# Patient Record
Sex: FEMALE | Race: WHITE
[De-identification: names, ages, dates, MRNs, and addresses within clinical notes are randomized per-mention and may not be internally consistent; named-entity substitution may affect disease eponyms.]

---

## 2017-03-30 ENCOUNTER — HOSPITAL ENCOUNTER (EMERGENCY)
Dept: HOSPITAL 25 - ED | Age: 37
Discharge: HOME | End: 2017-03-30
Payer: COMMERCIAL

## 2017-03-30 VITALS — DIASTOLIC BLOOD PRESSURE: 78 MMHG | SYSTOLIC BLOOD PRESSURE: 133 MMHG
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
## 2017-03-30 DIAGNOSIS — S82.832A: Primary | ICD-10-CM

## 2017-03-30 DIAGNOSIS — M25.572: ICD-10-CM
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
## 2017-03-30 LAB
ALBUMIN SERPL BCG-MCNC: 4.2 G/DL (ref 3.2–5.2)
ALP SERPL-CCNC: 65 U/L (ref 34–104)
ALT SERPL W P-5'-P-CCNC: 11 U/L (ref 7–52)
ANION GAP SERPL CALC-SCNC: 7 MMOL/L (ref 2–11)
AST SERPL-CCNC: 13 U/L (ref 13–39)
BUN SERPL-MCNC: 16 MG/DL (ref 6–24)
BUN/CREAT SERPL: 20.3 (ref 8–20)
CALCIUM SERPL-MCNC: 8.1 MG/DL (ref 8.6–10.3)
CHLORIDE SERPL-SCNC: 105 MMOL/L (ref 101–111)
GLOBULIN SER CALC-MCNC: 2.2 G/DL (ref 2–4)
GLUCOSE SERPL-MCNC: 112 MG/DL (ref 70–100)
HCO3 SERPL-SCNC: 25 MMOL/L (ref 22–32)
HCT VFR BLD AUTO: 42 % (ref 35–47)
HGB BLD-MCNC: 13.4 G/DL (ref 12–16)
MAGNESIUM SERPL-MCNC: 1.6 MG/DL (ref 1.9–2.7)
MCH RBC QN AUTO: 25 PG (ref 27–31)
MCHC RBC AUTO-ENTMCNC: 32 G/DL (ref 31–36)
MCV RBC AUTO: 77 FL (ref 80–97)
POTASSIUM SERPL-SCNC: 4 MMOL/L (ref 3.5–5)
PROT SERPL-MCNC: 6.4 G/DL (ref 6.4–8.9)
RBC # BLD AUTO: 5.44 10^6/UL (ref 4–5.4)
SODIUM SERPL-SCNC: 137 MMOL/L (ref 133–145)
WBC # BLD AUTO: 8.1 10^3/UL (ref 3.5–10.8)

## 2017-03-30 PROCEDURE — 36415 COLL VENOUS BLD VENIPUNCTURE: CPT

## 2017-03-30 PROCEDURE — 93005 ELECTROCARDIOGRAM TRACING: CPT

## 2017-03-30 PROCEDURE — 96374 THER/PROPH/DIAG INJ IV PUSH: CPT

## 2017-03-30 PROCEDURE — 86140 C-REACTIVE PROTEIN: CPT

## 2017-03-30 PROCEDURE — 83735 ASSAY OF MAGNESIUM: CPT

## 2017-03-30 PROCEDURE — 80053 COMPREHEN METABOLIC PANEL: CPT

## 2017-03-30 PROCEDURE — 99283 EMERGENCY DEPT VISIT LOW MDM: CPT

## 2017-03-30 PROCEDURE — 96376 TX/PRO/DX INJ SAME DRUG ADON: CPT

## 2017-03-30 PROCEDURE — 85025 COMPLETE CBC W/AUTO DIFF WBC: CPT

## 2017-03-30 NOTE — RAD
HISTORY: Left ankle swelling pain and trauma



COMPARISONS: None



VIEWS: 7, frontal and lateral views of the left foreleg with frontal, lateral, and oblique

views of the left ankle    



FINDINGS:



BONE DENSITY: Normal.

BONES: There is an oblique nondisplaced fracture of the distal fibula.    

JOINTS: The tibiofibular interval is normal.    

ALIGNMENT: There is no dislocation. 

SOFT TISSUES: Unremarkable.



OTHER FINDINGS: None.



IMPRESSION: 

OBLIQUE NONDISPLACED FRACTURE OF THE DISTAL FIBULA

## 2017-03-30 NOTE — ED
Lower Extremity





- HPI Summary


HPI Summary: 





Patient presents to ED with CC of left ankle pain and swelling after passing 

out and twisting ankle.  Mother saw patient fall and denies hitting her head.  

She states she doesn't remember falling and doesn't know why she fell, but 

started a new medication Wellbutrin this AM and took it without food.  She 

blacked out for approx 5 seconds, then came to with pain in the ankle.  Patient 

is obese, but denies other health history. Allergy to bioxin.  She denies 

eating anything today and drinking only coffee. This has never happened before. 

She is normally hypertensive, but mother who is an RN checked her BP after and 

it was "low."  She also was very weak, had fatigue and paleness per mother.  

Pulses were hard to find and mother also states she was cold.  She improved 

after a few minutes and felt back to normal again. Her only complaint now is 

slight dizziness and ringing in the ears - something very common to wellbutrin 

side effect.  





- History of Current Complaint


Chief Complaint: EDSyncope


Stated Complaint: SYNCOPE


Time Seen by Provider: 03/30/17 14:49


Hx Obtained From: Patient


Hx Last Menstrual Period: tubal


Mechanism Of Injury: Twisted - left ankle


Onset of Pain: Immediate


Onset/Duration: Minutes


Severity Initially: Moderate


Severity Currently: Moderate


Pain Intensity: 9


Pain Scale Used: 0-10 Numeric


Timing: Constant


Location: Is Discrete @ - left ankle


Character Of Pain: Aching, Throbbing


Associated Signs And Symptoms: Positive: Swelling


Aggravating Factor(s): Standing, Ambulation, Movement, Weight Bearing


Alleviating Factor(s): Rest


Able to Bear Weight: Yes





- Risk Factors


Gout Risk Factors: Obesity


DVT Risk Factors: Negative


Septic Arthritis Risk Factor: Negative





- Allergies/Home Medications


Allergies/Adverse Reactions: 


 Allergies











Allergy/AdvReac Type Severity Reaction Status Date / Time


 


Clarithromycin [From Biaxin] Allergy Severe Hives Verified 04/09/14 14:22











Home Medications: 


 Home Medications





Bupropion XL* [Wellbutrin XL *] 150 mg PO DAILY 03/30/17 [History Confirmed 03/ 30/17]


Escitalopram (NF) [Lexapro (NF)] 20 mg PO DAILY 03/30/17 [History Confirmed 03/ 30/17]











PMH/Surg Hx/FS Hx/Imm Hx


Previously Healthy: Yes


Endocrine/Hematology History: Reports: Hx Diabetes - corrected with weight loss


Cardiovascular History: Reports: Hx Hypertension - corrected with weight loss


Respiratory History: 


   Denies: Other Respiratory Problems/Disorders





- Surgical History


Surgery Procedure, Year, and Place: bariatric; D&C; skin removal x 2; wisdom 

teeth; R club foot as infant repaired


Infectious Disease History: No


Infectious Disease History: 


   Denies: Traveled Outside the [place] in Last 30 Days





- Social History


Occupation: Employed Full-time


Lives: With Family


Alcohol Use: Rare


Hx Substance Use: No


Substance Use Type: Reports: None


Hx Tobacco Use: No


Smoking Status (MU): Never Smoked Tobacco


Have You Smoked in the Last Year: No





Review of Systems


Constitutional: Negative


Eyes: Negative


ENT: Negative


Cardiovascular: Negative


Respiratory: Negative


Genitourinary: Negative


Positive: no symptoms reported, see HPI


Positive: Arthralgia, Myalgia


Skin: Negative


Neurological: Negative


Psychological: Normal


All Other Systems Reviewed And Are Negative: Yes





Physical Exam





- Summary


Physical Exam Summary: 





Thorough physical exam was performed, focusing on ankle special tests of left 

ankle.  Pain on palpation over lateral aspect and superior aspect of ankle over 

ATFL and deltoid ligaments. No pain on palpation over medial side. Due to 

patient pain around injury, physical exam was limited.  Unable to perform 

anterior drawer test or talar tilt test d/t pain.  Phillips test negative. 

Limited ROM. Dorsiflexion, great toe extension and plantar flexion intact 

however limited. No pain on palpation over medial or lateral lower extremity. 

No pain with knee flexion. Pulses intact bilaterally.  No temperature change or 

pallor noted bilaterally.  Ecchymosis and swelling noted on lateral aspect of 

left ankle.  No lesion or disruption of skin is seen. Unable to bear weight.  

Neurovacular exam intact.


Triage Information Reviewed: Yes


Vital Signs On Initial Exam: 


 Initial Vitals











Temp Pulse Resp BP Pulse Ox


 


 98.6 F   90   17   118/62   100 


 


 03/30/17 14:45  03/30/17 14:45  03/30/17 14:45  03/30/17 14:45  03/30/17 14:45











Vital Signs Reviewed: Yes


Appearance: Positive: Well-Appearing, Well-Nourished


Skin: Positive: Warm, Skin Color Reflects Adequate Perfusion, Other - swelling





- Reinier Coma Scale


Coma Scale Total: 15





Procedures





- Splinting


Hand-Made Type: plaster


Splint: posterior walking


Pre-Proc Neuro Vasc Exam: normal


Post-Proc Neuro Vasc Exam: normal





Diagnostics





- Vital Signs


 Vital Signs











  Temp Pulse Resp BP Pulse Ox


 


 03/30/17 15:00   95  19   99


 


 03/30/17 14:58   95  20   97


 


 03/30/17 14:45  98.6 F  90  17  118/62  100














- Laboratory


Lab Results: 


 Lab Results











  03/30/17 Range/Units





  15:11 


 


WBC  8.1  (3.5-10.8)  10^3/ul


 


RBC  5.44 H  (4.0-5.4)  10^6/ul


 


Hgb  13.4  (12.0-16.0)  g/dl


 


Hct  42  (35-47)  %


 


MCV  77 L  (80-97)  fL


 


MCH  25 L  (27-31)  pg


 


MCHC  32  (31-36)  g/dl


 


RDW  18 H  (10.5-15)  %


 


Plt Count  202  (150-450)  10^3/ul


 


MPV  9  (7.4-10.4)  um3


 


Neut % (Auto)  89.5 H  (38-83)  %


 


Lymph % (Auto)  5.9 L  (25-47)  %


 


Mono % (Auto)  3.2  (1-9)  %


 


Eos % (Auto)  0.7  (0-6)  %


 


Baso % (Auto)  0.7  (0-2)  %


 


Absolute Neuts (auto)  7.3  (1.5-7.7)  10^3/ul


 


Absolute Lymphs (auto)  0.5 L  (1.0-4.8)  10^3/ul


 


Absolute Monos (auto)  0.3  (0-0.8)  10^3/ul


 


Absolute Eos (auto)  0.1  (0-0.6)  10^3/ul


 


Absolute Basos (auto)  0.1  (0-0.2)  10^3/ul


 


Absolute Nucleated RBC  0  10^3/ul


 


Nucleated RBC %  0  











Result Diagrams: 


 03/30/17 15:11





 03/30/17 15:11


Lab Statement: Any lab studies that have been ordered have been reviewed, and 

results considered in the medical decision making process.





Lower Extremity Course/Dx





- Course


Course Of Treatment: Patient EKG WNL.  Labs WNL.  Discussed the discontinuation 

of Wellbutrin based on symptoms. She will follow up with PCP regarding 

medication changes.  Based on [place] [facility] Rules, patient sent to imaging.  

Xray shows: IMPRESSION:  OBLIQUE NONDISPLACED FRACTURE OF THE DISTAL FIBULA.  

Soft tissue swelling noted over the lateral aspect of the ankle. Medial and 

lateral distal lower extremity without pain and x-rays show no widening of the 

ankle joint regarding low suspicion for Maisonneuve fx.  Left lower leg without 

abnormalities or fx seen on xray. Posterior walking splint placed: plaster. 

Neurovascular exam intact. Crutches given. Patient given orthopedic follow up 

in 5-7 days.  Encouraged Ibuprofen 600mg three times daily with meals for pain.

  Hydrocodone given for breakthrough pain. Return precautions given.  Educated 

patient regarding ankle injuries and healing time and the possibility of 

further evaluation and imaging as orthopedist sees fit.  Patient advised to 

discontinue medication Wellbutrin until follow up with PCP.  Patient agrees 

with plan and OK for discharge home.





- Diagnoses


Differential Diagnosis/HQI/PQRI: Positive: Fracture (Closed), Fracture (Open), 

Sprain, Strain, Other - medication change, medication reaction


Provider Diagnoses: 


 Fracture of distal fibula








Discharge





- Discharge Plan


Condition: Stable


Disposition: HOME


Prescriptions: 


Hydrocodone-Acetaminophen [Hydrocodone/Acetaminophen  mg] 1 tab PO Q6H 

PRN #12 tab MDD 4


 PRN Reason: Pain


Patient Education Materials:  Ankle Fracture (ED)


Referrals: 


Shamika Gonzalez MD [Primary Care Provider] - 


Ivania Moon MD [Medical Doctor] - 


Additional Instructions: 


Crutches for ambulation given.


Ibuprofen 600mg three times daily with meals for pain.  


Hydrocodone as needed for breakthrough pain


Follow up with orthopedic physician in 5-7 days. 


If numbness, tingling, decreased sensation, increased pain, temperature changes 

or pallor noted in toes, come back to ER immediately. 


Protect the area.   For your comfort level, do not bear weight, pull or push 

until you can injury is somewhat healed.  This may involve the need for 

immobilization or crutches for a period of time.


Rest the involved area, but not too long.  You may need to be off your injury 

for some time to allow for healing, however excessive immobilization of joints 

can lead to stiffness and delay healing time. Early mobilization is encouraged 

if it is pain-free.


Ice.  Not directly on the skin. Cover with a towel. Apply ice no more than 30 

minutes at a time 


Compression:  You may use and keep an ace wrap bandage over the injury to 

decrease swelling. Again, this should be limited and be taken off periodically 

to encourage early range of motion and mobilization.


Elevate: Try to elevate the injured area above the heart whenever possible.

## 2018-04-01 ENCOUNTER — HOSPITAL ENCOUNTER (EMERGENCY)
Dept: HOSPITAL 25 - UCEAST | Age: 38
Discharge: HOME | End: 2018-04-01
Payer: COMMERCIAL

## 2018-04-01 VITALS — DIASTOLIC BLOOD PRESSURE: 95 MMHG | SYSTOLIC BLOOD PRESSURE: 152 MMHG

## 2018-04-01 DIAGNOSIS — R09.81: ICD-10-CM

## 2018-04-01 DIAGNOSIS — F32.9: ICD-10-CM

## 2018-04-01 DIAGNOSIS — H81.10: Primary | ICD-10-CM

## 2018-04-01 DIAGNOSIS — Z88.1: ICD-10-CM

## 2018-04-01 PROCEDURE — 99211 OFF/OP EST MAY X REQ PHY/QHP: CPT

## 2018-04-01 PROCEDURE — G0463 HOSPITAL OUTPT CLINIC VISIT: HCPCS

## 2018-04-01 NOTE — UC
Ear Complaint HPI





- HPI Summary


HPI Summary: 





c/o episode of 'everything moving around me' as she turn her head to the right 

in her bed 2 evenings ago. Denies nausea or vomiting and the episode lasted for 

a couple of seconds after she turned head the other way. She c/o nasal 

congestion but denies fever or itching.





- History of Current Complaint


Chief Complaint: UCEar


Stated Complaint: EAR PAIN DIZZY


Time Seen by Provider: 04/01/18 10:49


Hx Obtained From: Patient


Hx Last Menstrual Period: 3/1/18


Pregnant?: No


Onset/Duration: Sudden Onset, Lasting Minutes


Severity Initially: Severe


Severity Currently: None


Pain Intensity: 0


Aggravating Factors: Other - turning head to the right


Alleviating Factors: Other (Noted In Comments) - turning head to the left





- Allergies/Home Medications


Allergies/Adverse Reactions: 


 Allergies











Allergy/AdvReac Type Severity Reaction Status Date / Time


 


clarithromycin [From Biaxin] Allergy  hives Verified 04/01/18 10:20





   breathing  





   anaph  











Home Medications: 


 Home Medications





Cyanocobalamin INJ * [Vitamin B12 INJ *] 1,000 mcg IM 04/01/18 [History]











PMH/Surg Hx/FS Hx/Imm Hx


Previously Healthy: Yes


Psychological History: Depression





- Surgical History


Surgical History: Yes


Surgery Procedure, Year, and Place: bariatric; D&C; skin removal x 2; wisdom 

teeth; R club foot as infant repaired





- Social History


Alcohol Use: Rare


Substance Use Type: None


Smoking Status (MU): Never Smoked Tobacco


Have You Smoked in the Last Year: No





- Immunization History


Most Recent Influenza Vaccination: 2013


Most Recent Tetanus Shot: 4/2013


Most Recent Pneumonia Vaccination: none





Review of Systems


Neurological: Other - dizziness


All Other Systems Reviewed And Are Negative: Yes





Physical Exam


Triage Information Reviewed: Yes


Appearance: Well-Appearing, No Pain Distress, Obese


Vital Signs: 


 Initial Vital Signs











Temp  98.8 F   04/01/18 10:17


 


Pulse  86   04/01/18 10:17


 


Resp  16   04/01/18 10:17


 


BP  152/95   04/01/18 10:17


 


Pulse Ox  100   04/01/18 10:17











Vital Signs Reviewed: Yes


Eyes: Positive: Conjunctiva Clear


ENT: Positive: Hearing grossly normal, Pharynx normal, TM dull, Uvula midline


Neck: Positive: Supple, Nontender, No Lymphadenopathy


Respiratory: Positive: Chest non-tender, Lungs clear, Normal breath sounds, No 

respiratory distress


Cardiovascular: Positive: RRR, No Murmur, Pulses Normal, Brisk Capillary Refill


Neurological: Positive: Alert, Muscle Tone Normal, Other: - gait wnl, no ataxia

, no adiadocokinesia, CN II-XII wnl, romberg negative, FROM x4, sensory intact, 

DTR brisk and symmetrical





Ear Complaint Course/Dx





- Course


Course Of Treatment: Epley maneuver explained and literature provided. Benign 

nature of condition discussed with patient , reassurance given





- Differential Dx/Diagnosis


Provider Diagnoses: BPPV





Discharge





- Sign-Out/Discharge


Documenting (check all that apply): Discharge





- Discharge Plan


Condition: Stable


Disposition: HOME


Patient Education Materials:  Benign Paroxysmal Positional Vertigo (ED)


Referrals: 


Shamika Gonzalez MD [Primary Care Provider] - 





- Billing Disposition and Condition


Condition: STABLE


Disposition: HOME

## 2019-01-13 NOTE — HP
PREOPERATIVE HISTORY AND PHYSICAL:

 

DATE OF ADMISSION/SURGERY:  19

 

DATE OF OFFICE VISIT/ENCOUNTER:  19

 

ATTENDING SURGEON:  Glenda Vargas MD * (DICTATED BY SHUBHAM PEREZ)

 

PROCEDURE:  Right wrist carpal tunnel release.

 

CHIEF COMPLAINT:  Numbness and tingling, right hand.

 

HISTORY OF PRESENT ILLNESS:  This is a 38-year-old female, who complains of 
numbness and tingling in her right hand off and on for 2 years, but much worse 
more recently.  She did not recall any specific injury.  Originally, she thinks 
it started with knitting and more recently with coloring.  She says sometimes 
she has pain at night that goes all the way up to the mid aspect of her upper 
arm.  She has numbness in her thumb, index, middle and ring fingers.  She does 
not ever have numbness in her small finger.  She uses a brace at night, which 
is sometimes helpful and she uses a brace during the day sometimes as well.  
She denies any neck pain.  She has consented to proceed with a right wrist 
carpal tunnel release.

 

PAST MEDICAL HISTORY:

1.  Borderline hypertension.

2.  Anxiety/depression.

3.  Anemia.

 

PAST SURGICAL HISTORY:

1.  Bariatric surgery in 2009 and a revision in 2018.

2.  .

3.  Interior teeth extraction.

4.  Clubfoot correction.

5.  Breast reduction.

 

The patient denies any problems with anesthesia.

 

MEDICATIONS:

1.  B12 2500 mcg daily.

2.  Ferrous gluconate 324 (38 Fe) mg by mouth daily.

3.  Lexapro 20 mg daily.

4.  Vitamin D3 1000 units daily.

 

ALLERGIES:  BIAXIN causes hives.

 

FAMILY MEDICAL HISTORY:  Hypertension, diabetes, heart disease, 
hypercholesterolemia.

 

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  She is a former smoker.  
She quit in .  Prior to that, she was smoking a pack every 3 days.  She 
denies recreational drug use.

 

REVIEW OF SYSTEMS:  Negative for general, cephalic, cardiovascular, respiratory
, GI, , other musculoskeletal, integumentary, endocrine, neurologic, and 
hematologic symptoms.  nfectious Diseases: Negative for MRSA, hepatitis C, HIV.

 

                               PHYSICAL EXAMINATION

 

GENERAL:  Well-developed, well-nourished 38-year-old female, in no acute 
distress.

 

VITAL SIGNS:  Height 5 feet 7 inches, weight 322 pounds.  Pulse rate 60, blood 
pressure 124/88.

 

HEENT:  Normocephalic, atraumatic.  Pupils are equal, round, and reactive to 
light and accommodation.  Extraocular movements are intact.  Throat is clear.

 

NECK:  Supple.  No palpable lymph nodes.

 

PULMONARY:  Lungs are clear to auscultation bilaterally.  No wheezes, rales, or 
rhonchi.

 

CARDIOVASCULAR:  Regular rate and rhythm.  S1, S2.  No murmurs, rubs, or 
gallops. No edema.

 

ABDOMEN:  Positive bowel sounds.  Soft, nontender.

 

NEUROLOGICAL:  Alert and oriented x3.  Cranial nerves II through XII are 
intact. Sensation is intact to light touch.

 

MUSCULOSKELETAL:  On exam of her right hand, there is no thenar wasting.  She 
has slight weakness with thumb abduction on the right.  She has full flexion 
and extension of her fingers.  Wrist motion is normal.  She has a positive Tinel
's sign at the median nerve at the wrist and a positive median nerve 
compression test. Negative Tinel's sign of the ulnar nerve at the right elbow 
and the right wrist.

 

 IMPRESSION:  Right carpal tunnel syndrome.

 

PLAN:  The patient is scheduled to undergo a right wrist carpal tunnel release 
with Dr. Vargas on 19.  She will return to the office 10 days postop for 
followup and suture removal.  A prescription for Tylenol No. 3 was e-scribed 
for pain management.

 

 ____________________________________ SHUBHAM PEREZ

 

[id number] #: [id number]

EDMOND

## 2019-01-18 ENCOUNTER — HOSPITAL ENCOUNTER (OUTPATIENT)
Dept: HOSPITAL 25 - OR | Age: 39
Discharge: HOME | End: 2019-01-18
Attending: ORTHOPAEDIC SURGERY
Payer: COMMERCIAL

## 2019-01-18 VITALS — DIASTOLIC BLOOD PRESSURE: 70 MMHG | SYSTOLIC BLOOD PRESSURE: 121 MMHG

## 2019-01-18 DIAGNOSIS — G56.01: Primary | ICD-10-CM

## 2019-01-18 DIAGNOSIS — Z87.891: ICD-10-CM

## 2019-01-18 DIAGNOSIS — F41.8: ICD-10-CM

## 2019-01-18 DIAGNOSIS — R03.0: ICD-10-CM

## 2019-01-18 PROCEDURE — 81025 URINE PREGNANCY TEST: CPT

## 2019-01-18 NOTE — OP
DATE OF OPERATION:  01/18/19 - [facility]

 

DATE OF BIRTH:  [birth year].

 

SURGEON:  Glenda Vargas MD.

 

ASSISTANT:  SHUBHAM Harrison.

 

ANESTHESIA:  Local MAC.

 

PRE-OP DIAGNOSIS:  Right carpal tunnel syndrome.

 

POST-OP DIAGNOSIS:  Right carpal tunnel syndrome.

 

OPERATIVE PROCEDURE:  Right carpal tunnel release.

 

INDICATIONS FOR PROCEDURE:  Carissa is a 38-year-old female with numbness and 
tingling in the median nerve distribution of her right hand.  She presents for 
right carpal tunnel release.

 

ESTIMATED BLOOD LOSS:  Zero.

 

TOURNIQUET TIME:  Approximately 5 minutes.

 

DESCRIPTION OF PROCEDURE:  The patient was brought to the operating room, was 
given a sedation anesthetic and a local infiltration of 10 cc of 1% plain 
lidocaine in the palm of her right hand.  The skin of her right hand and 
forearm was prepped and draped in the usual sterile fashion.  The hand and 
forearm were exsanguinated and the tourniquet elevated to 250 mmHg.  A 
longitudinal incision was made in the palm in line with the ring finger.  We 
dissected through the subcutaneous tissue down to the transverse carpal 
ligament.  The ligament was divided sharply with a knife and then more 
proximally with the scissors.  The nerve was dissected free from the 
surrounding tissue and there was an area of moderate compression at the mid 
portion of the ligament.  The wound was irrigated and the skin edges were 
reapproximated with 4-0 nylon suture.  The wound was dressed with Xeroform, 4x4
, Webril, and an Ace wrap.  The patient tolerated the procedure well and was 
brought to the recovery room in good condition.

 

 [id number]/CPS #: [id number]

MTDD

## 2019-07-31 ENCOUNTER — HOSPITAL ENCOUNTER (EMERGENCY)
Dept: HOSPITAL 25 - UCEAST | Age: 39
Discharge: HOME | End: 2019-07-31
Payer: COMMERCIAL

## 2019-07-31 VITALS — SYSTOLIC BLOOD PRESSURE: 120 MMHG | DIASTOLIC BLOOD PRESSURE: 90 MMHG
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
## 2019-07-31 DIAGNOSIS — K21.9: ICD-10-CM

## 2019-07-31 DIAGNOSIS — Z87.891: ICD-10-CM
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
## 2019-07-31 DIAGNOSIS — Z98.84: ICD-10-CM

## 2019-07-31 DIAGNOSIS — F32.9: ICD-10-CM
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
## 2019-07-31 DIAGNOSIS — S62.512A: Primary | ICD-10-CM
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
## 2019-07-31 PROCEDURE — G0463 HOSPITAL OUTPT CLINIC VISIT: HCPCS

## 2019-07-31 PROCEDURE — 99212 OFFICE O/P EST SF 10 MIN: CPT

## 2019-07-31 NOTE — UC
- Progress Note


Progress Note: 


Final x-ray read reviewed. IMPRESSION: FRACTURE OF THE BASE OF THE PROXIMAL 

PHALANX OF THE FIRST DIGIT. Consistent with wet read. No change in POC.








Course/Dx





- Diagnoses


Provider Diagnoses: 


 Closed avulsion fracture of left thumb








Discharge





- Sign-Out/Discharge


Documenting (check all that apply): Post-Discharge Follow Up


All imaging exams completed and their final reports reviewed: Yes





- Discharge Plan


Condition: Stable


Disposition: HOME


Patient Education Materials:  Finger Fracture (ED)


Referrals: 


Shamika Gonzalez MD [Primary Care Provider] - 


Rafaela Aquino MD [Medical Doctor] - 


Additional Instructions: 


The x-ray performed in the clinic today showed evidence of an avulsion fracture 

of the proximal phalanx of the left thumb.





Rest the hand as much as possible. Wear the thumb spica splint that was applied 

in the clinic. You may remove to shower but should wear at all other times.





Apply ice to the affected area for 15-20 minutes at least 4 times a day to help 

with the pain and swelling.





Elevate the hand to help reduce swelling.





Take acetaminophen (Tylenol) or ibuprofen (Advil, Motrin) according to 

directions as needed for pain.





Follow up with orthopedic surgery in 5-7 days for further evaluation and 

treatment.





Seek immediate medical attention if you have severe pain not managed with pain 

medication,  develop numbness or tingling in the hand or fingers, or have any 

worsening of symptoms.





- Billing Disposition and Condition


Condition: STABLE


Disposition: Home





- Attestation Statements


Provider Attestation: 





I was available for consult. This patient was seen by the DAMARIS. The patient was 

not presented to, seen by, or examined by me. Navid Camargo MD

## 2019-07-31 NOTE — UC
Hand/Wrist HPI





- HPI Summary


HPI Summary: 


39 year old female presents for left thumb pain. States she was working out at 

the gym performs a bear crawl and she caused a hyperextension injury of the 

thumb just prior to arrival. Pain is localized to the base of the thumb. 

Worsens with any movement. Denies numbness or tingling.








- History Of Current Complaint


Chief Complaint: UCUpperExtremity


Stated Complaint: L THUMB INJ


Time Seen by Provider: 07/31/19 10:02


Hx Obtained From: Patient


Hx Last Menstrual Period: 6/30/19


Pain Intensity: 8





- Allergies/Home Medications


Allergies/Adverse Reactions: 


 Allergies











Allergy/AdvReac Type Severity Reaction Status Date / Time


 


clarithromycin [From Biaxin] Allergy Severe Hives Verified 07/31/19 10:01














PMH/Surg Hx/FS Hx/Imm Hx


GI/ History: Gastroesophageal Reflux


Psychological History: Depression





- Surgical History


Surgical History: Yes


Surgery Procedure, Year, and Place: Aminata en Y 2009.  D&C.  Skin removal x 2 

2011.  [place] teeth.  Right Club Foot as infant repaired.  tubal.  C Section 

2014- Twins.  Bariatric Revision 2018





- Family History


Known Family History: Positive: Non-Contributory





- Social History


Occupation: Works From/At Home


Lives: With Family


Alcohol Use: None


Substance Use Type: None


Smoking Status (MU): Former Smoker


Type: Cigarettes


Amount Used/How Often: Social smoker in high school


Have You Smoked in the Last Year: No


When Did the Patient Quit Smoking/Using Tobacco: 1999





- Immunization History


Most Recent Influenza Vaccination: 2013


Most Recent Tetanus Shot: 4/2013


Most Recent Pneumonia Vaccination: none





Review of Systems


All Other Systems Reviewed And Are Negative: Yes


Constitutional: Positive: Negative


Skin: Negative: Bruising


Respiratory: Positive: Negative


Cardiovascular: Positive: Negative


Gastrointestinal: Positive: Negative


Genitourinary: Positive: Negative


Motor: Negative: Weakness


Neurovascular: Negative: Decreased Sensation


Musculoskeletal: Positive: Other: - See HPI


Neurological: Positive: Negative


Is Patient Immunocompromised?: No





Physical Exam





- Summary


Physical Exam Summary: 


GENERAL APPEARANCE: Well developed, well nourished, alert and cooperative, and 

appears to be in no acute distress.





CARDIAC: Normal S1 and S2. No S3, S4 or murmurs. Rhythm is regular. There is no 

peripheral edema, cyanosis or pallor. Extremities are warm and well perfused. 

Capillary refill is less than 2 seconds. Peripheral pulses intact.





LUNGS: Clear to auscultation without rales, rhonchi, wheezing or diminished 

breath sounds.





ABDOMEN: Positive bowel sounds. Soft, nondistended, nontender. No guarding or 

rebound. No masses or hepatosplenomegally.





MUSKULOSKELETAL: Normal muscular development. Normal gait.





EXTREMITIES: Tenderness to the base of the left thumb immediately above the MCP 

without gross deformity, ecchymosis, or edema. Circulation and sensation intact.





SKIN: Skin normal color, texture and turgor with no lesions or eruptions.





Triage Information Reviewed: Yes


Vital Signs: 


 Initial Vital Signs











Temp  98.8 F   07/31/19 09:52


 


Pulse  80   07/31/19 09:52


 


Resp  16   07/31/19 09:52


 


BP  120/90   07/31/19 09:52


 


Pulse Ox  97   07/31/19 09:52











Vital Signs Reviewed: Yes





Diagnostics





- Radiology


  ** No standard instances


Radiology Interpretation Completed By: ED Physician - Avulsion fracture 

proximal phalanx of the left thumb





Hand/Wrist Course/Dx





- Course


Course Of Treatment: 


39 year old female presents for left thumb pain. States she was working out at 

the gym performs a bear crawl and she caused a hyperextension injury of the 

thumb just prior to arrival. Pain is localized to the base of the thumb. 

Worsens with any movement. Denies numbness or tingling. Afebrile. VSS. Patient 

had tenderness to the base of the left thumb immediately above the MCP without 

gross deformity, ecchymosis, or edema. Circulation and sensation intact.  

Afebrile.  Vital signs stable.  Patient had tenderness to the base of the left 

thumb immediately above the MCP without gross deformity, ecchymosis, or edema. 

Circulation and sensation intact.  Patient was given naproxen 500 mg PO for 

pain.  Preliminary reading of the x-ray showed a small avulsion fracture of the 

proximal phalanx of the left thumb.  Patient was placed in a thumb spica splint 

by the RN.  Circulation and sensation were intact pre-and post-application.  

Recommending conservative treatment including over-the-counter analgesics and 

RICE.  Patient is to follow-up with orthopedic surgery in 5-7 days.  

Anticipatory guidance and warning symptoms were reviewed with the patient.  

Verbalizes understanding and agrees with plan of care.








- Differential Dx/Diagnosis


Differential Diagnosis/HQI/PQRI: Dislocation, Fracture, Sprain


Provider Diagnosis: 


 Closed avulsion fracture of left thumb








Discharge





- Sign-Out/Discharge


Documenting (check all that apply): Patient Departure


All imaging exams completed and their final reports reviewed: No





- Discharge Plan


Condition: Stable


Disposition: HOME


Patient Education Materials:  Finger Fracture (ED)


Referrals: 


Shamika Gonzalez MD [Primary Care Provider] - 


Rafaela Aquino MD [Medical Doctor] - 


Additional Instructions: 


The x-ray performed in the clinic today showed evidence of an avulsion fracture 

of the proximal phalanx of the left thumb.





Rest the hand as much as possible. Wear the thumb spica splint that was applied 

in the clinic. You may remove to shower but should wear at all other times.





Apply ice to the affected area for 15-20 minutes at least 4 times a day to help 

with the pain and swelling.





Elevate the hand to help reduce swelling.





Take acetaminophen (Tylenol) or ibuprofen (Advil, Motrin) according to 

directions as needed for pain.





Follow up with orthopedic surgery in 5-7 days for further evaluation and 

treatment.





Seek immediate medical attention if you have severe pain not managed with pain 

medication,  develop numbness or tingling in the hand or fingers, or have any 

worsening of symptoms.





- Billing Disposition and Condition


Condition: STABLE


Disposition: Home





- Attestation Statements


Provider Attestation: 





I was available for consult. This patient was seen by the [facility]. The patient was 

not presented to, seen by, or examined by me. Navid Camargo MD